# Patient Record
(demographics unavailable — no encounter records)

---

## 2024-10-15 NOTE — PHYSICAL EXAM
[Normal] : no acute distress, well nourished, well developed and well-appearing [de-identified] : No overt abnormalities of the testicles appreciated on exam [de-identified] : Left inguinal tenderness to palpation. No masses felt, no obvious hernias present [de-identified] : Reproducible pain upon palpation along paraspinal region on left side [de-identified] : Back pain not elicited with straight leg raise

## 2024-10-15 NOTE — PHYSICAL EXAM
[Normal] : no acute distress, well nourished, well developed and well-appearing [de-identified] : Left inguinal tenderness to palpation. No masses felt, no obvious hernias present [de-identified] : No overt abnormalities of the testicles appreciated on exam [de-identified] : Reproducible pain upon palpation along paraspinal region on left side [de-identified] : Back pain not elicited with straight leg raise

## 2024-10-15 NOTE — PHYSICAL EXAM
[Normal] : no acute distress, well nourished, well developed and well-appearing [de-identified] : No overt abnormalities of the testicles appreciated on exam [de-identified] : Left inguinal tenderness to palpation. No masses felt, no obvious hernias present [de-identified] : Reproducible pain upon palpation along paraspinal region on left side [de-identified] : Back pain not elicited with straight leg raise

## 2024-10-15 NOTE — REVIEW OF SYSTEMS
[Abdominal Pain] : abdominal pain [Back Pain] : back pain [Fever] : no fever [Fatigue] : no fatigue [Night Sweats] : no night sweats [Recent Change In Weight] : ~T no recent weight change [Discharge] : no discharge [Earache] : no earache [Chest Pain] : no chest pain [Shortness Of Breath] : no shortness of breath [Cough] : no cough [Constipation] : no constipation [Diarrhea] : no diarrhea [Vomiting] : no vomiting [Incontinence] : no incontinence [Hesitancy] : no hesitancy [FreeTextEntry9] : Per HPI

## 2024-10-15 NOTE — REVIEW OF SYSTEMS
[Abdominal Pain] : abdominal pain [Back Pain] : back pain [Fever] : no fever [Fatigue] : no fatigue [Night Sweats] : no night sweats [Recent Change In Weight] : ~T no recent weight change [Discharge] : no discharge [Earache] : no earache [Chest Pain] : no chest pain [Shortness Of Breath] : no shortness of breath [Cough] : no cough [Constipation] : no constipation [Diarrhea] : no diarrhea [Vomiting] : no vomiting [Hesitancy] : no hesitancy [Incontinence] : no incontinence [FreeTextEntry9] : Per HPI

## 2024-10-15 NOTE — ASSESSMENT
[FreeTextEntry1] : 61 year old man with history of T2DM, chronic back pain presenting for acute visit  #Back pain #Groin pain #Posterior left leg pain - Pain reproducible on palpation of paraspinal region on left side - No obvious masses on palpation of abdomen, no hernias appreciated - No red flag symptoms elicited on history - Suspect likely MSK etiology, though patient unable to think of inciting event. Possible muscular insertions irritating nerve roots - Lower suspicion for kidney stone, no endorsed hematuria and given overall duration of groin pain - Will trial meloxicam 15mg daily for 10 days, with addition as needed doses daily at patient discretion - Will also trial as needed 10mg flexeril taken at bedtime. Cautioned no driving if patient felt to be too drowsy - Cautioned to not take other NSAIDs while on meloxicam. Can continue with tylenol as needed - Patient counseled to contact office if still no improvement or worsening of symptoms despite treatment. ED precautions given as well regarding red flag symptom onset or pain not relieved by medication  Return to clinic as needed   Patient's visit and plan of care discussed with Dr. Simón Bush MD PGY3

## 2024-10-15 NOTE — PHYSICAL EXAM
[Normal] : no acute distress, well nourished, well developed and well-appearing [de-identified] : No overt abnormalities of the testicles appreciated on exam [de-identified] : Left inguinal tenderness to palpation. No masses felt, no obvious hernias present [de-identified] : Reproducible pain upon palpation along paraspinal region on left side [de-identified] : Back pain not elicited with straight leg raise

## 2024-10-15 NOTE — HISTORY OF PRESENT ILLNESS
[FreeTextEntry8] : 61 year old man with history of T2DM, chronic back pain presenting for acute visit Chronic back pain usually bilateral  Groin pain for about a month's duration, which he was seen for previously. Getting worse over past three days 3 days ago started with worsening pain in back on left side that will travel down back of leg to level of knee. Feels like shock. Happens when sitting/driving. Not as bad when up and walking. Bending over makes pain worse in back Last night thought he was going to have to go to the emergency room. Took Aleve which helped make pain better Has tried Tylenol and lidocaine patches, don't overly help No issues with urination or with having bowel movement No pain with urination No blood in urine No recent injuries, no heavy lifting No nausea or vomiting No fevers Nothing else except moving makes pain worse Pain is constant in all the areas

## 2024-10-25 NOTE — ASSESSMENT
[FreeTextEntry1] : Patient presents with difficulty lifting upper lips due to lips mass. No neurological deficits. MRI brain negative for stroke.   Plan - ENT referral - Can stop aspirin, but should continue atorvastatin due to elevated cholesterol - No need to followup with neurology  Discussed with Dr. Solano.

## 2024-10-25 NOTE — HISTORY OF PRESENT ILLNESS
[FreeTextEntry1] : 62 yo male with PMH of DM not on medications presents with difficulty lifting his upper lips when he smiles since 2 weeks ago. He feels there is a mass on the left side of his upper lips with some pain and numbness in the local area when he touches. Had a negative brain MRI. Denies weakness, numbness, vision changes, speech changes, difficulty swallowing.

## 2024-10-25 NOTE — END OF VISIT
[] : Resident [FreeTextEntry3] : Patient with problems smiling for the past two weeks. Palpable mass on his gums/lip during this time, no major pain. Denies any other focal neurologic deficits. Normal neurologic exam save for decreased activation of nasalis muscle due to mass. Will refer to ENT. Can stop ASA as not a cerebrovascular event but can continue statin. [Time Spent: ___ minutes] : I have spent [unfilled] minutes of time on the encounter which excludes teaching and separately reported services.

## 2024-10-25 NOTE — PHYSICAL EXAM
[General Appearance - Alert] : alert [General Appearance - In No Acute Distress] : in no acute distress [Oriented To Time, Place, And Person] : oriented to person, place, and time [Person] : oriented to person [Place] : oriented to place [Time] : oriented to time [Cranial Nerves Optic (II)] : visual acuity intact bilaterally,  visual fields full to confrontation, pupils equal round and reactive to light [Cranial Nerves Oculomotor (III)] : extraocular motion intact [Cranial Nerves Trigeminal (V)] : facial sensation intact symmetrically [Cranial Nerves Facial (VII)] : face symmetrical [Cranial Nerves Vestibulocochlear (VIII)] : hearing was intact bilaterally [Cranial Nerves Glossopharyngeal (IX)] : tongue and palate midline [Cranial Nerves Accessory (XI - Cranial And Spinal)] : head turning and shoulder shrug symmetric [Cranial Nerves Hypoglossal (XII)] : there was no tongue deviation with protrusion [Motor Tone] : muscle tone was normal in all four extremities [Motor Strength] : muscle strength was normal in all four extremities [Sensation Tactile Decrease] : light touch was intact [Abnormal Walk] : normal gait [2+] : Patella left 2+ [1+] : Ankle jerk left 1+ [Dysdiadochokinesia Bilaterally] : not present [Coordination - Dysmetria Impaired Finger-to-Nose Bilateral] : not present

## 2024-10-25 NOTE — HISTORY OF PRESENT ILLNESS
[FreeTextEntry1] : 60 yo male with PMH of DM not on medications presents with difficulty lifting his upper lips when he smiles since 2 weeks ago. He feels there is a mass on the left side of his upper lips with some pain and numbness in the local area when he touches. Had a negative brain MRI. Denies weakness, numbness, vision changes, speech changes, difficulty swallowing.

## 2024-11-05 NOTE — PHYSICAL EXAM
[No Acute Distress] : no acute distress [Well Nourished] : well nourished [Well Developed] : well developed [Well-Appearing] : well-appearing [Normal Sclera/Conjunctiva] : normal sclera/conjunctiva [PERRL] : pupils equal round and reactive to light [EOMI] : extraocular movements intact [Normal Outer Ear/Nose] : the outer ears and nose were normal in appearance [Normal Oropharynx] : the oropharynx was normal [No JVD] : no jugular venous distention [No Lymphadenopathy] : no lymphadenopathy [Supple] : supple [Thyroid Normal, No Nodules] : the thyroid was normal and there were no nodules present [No Respiratory Distress] : no respiratory distress  [No Accessory Muscle Use] : no accessory muscle use [Clear to Auscultation] : lungs were clear to auscultation bilaterally [Normal Rate] : normal rate  [Regular Rhythm] : with a regular rhythm [Normal S1, S2] : normal S1 and S2 [No Murmur] : no murmur heard [No Carotid Bruits] : no carotid bruits [No Abdominal Bruit] : a ~M bruit was not heard ~T in the abdomen [No Varicosities] : no varicosities [Pedal Pulses Present] : the pedal pulses are present [No Edema] : there was no peripheral edema [No Palpable Aorta] : no palpable aorta [No Extremity Clubbing/Cyanosis] : no extremity clubbing/cyanosis [Soft] : abdomen soft [Non Tender] : non-tender [Non-distended] : non-distended [No Masses] : no abdominal mass palpated [No HSM] : no HSM [Normal Bowel Sounds] : normal bowel sounds [Normal Posterior Cervical Nodes] : no posterior cervical lymphadenopathy [Normal Anterior Cervical Nodes] : no anterior cervical lymphadenopathy [No CVA Tenderness] : no CVA  tenderness [No Spinal Tenderness] : no spinal tenderness [No Joint Swelling] : no joint swelling [Grossly Normal Strength/Tone] : grossly normal strength/tone [No Rash] : no rash [Coordination Grossly Intact] : coordination grossly intact [No Focal Deficits] : no focal deficits [Normal Gait] : normal gait [Deep Tendon Reflexes (DTR)] : deep tendon reflexes were 2+ and symmetric [Normal Affect] : the affect was normal [Normal Insight/Judgement] : insight and judgment were intact [de-identified] : Pain with palpation of the posterolateral left testicle, no pain with palpation of epididymis, increased pain when lifting testicle

## 2024-11-05 NOTE — ASSESSMENT
[FreeTextEntry1] : 61 year old man with history of T2DM, chronic back pain presenting for acute visit for recurrent testicular pain    #Groin pain - previous course of Flexeril and meloxicam can help relieve symptoms - low suspicion for testicular torsion. Patient is not in acute distress at the time of the visit  - No erythema of swelling seen on exam   PLAN  - CT AP with IV contrast to evaluate for abdominal pathology including hernia not seen on exam  - Testicular US  - renewed Flexeril and Meloxicam  - advised on alarm symptoms of testicular Torsion and need to go to the ED - low threshold to call back and return for worsening of symptoms

## 2024-11-05 NOTE — HISTORY OF PRESENT ILLNESS
[FreeTextEntry8] : 61 year old man with history of T2DM, chronic back pain presenting for acute visit  #Scrotal pain  Presenting for pain in his right testicle. Was seen earlier in October at that time also radiated to his back. Was given Meloxicam and Flexeril which did help but pain returned only in testicles yesterday morning. Pain went away for a week after completing course of muscle relaxant  and since it returned the has taken Alleve and the last of his meloxicam helped minimally. Endorses an 8/10 pain this morning, Pain also radiates to his right hip along the path of the inguinal ligament.  No issues with urination or with having bowel movement No pain with urination No blood in urine No recent injuries, no heavy lifting No nausea or vomiting No fevers Sexually active with one partner  No discharge from the penis

## 2024-12-16 NOTE — PHYSICAL EXAM
[Normal Appearance] : normal appearance [] : no respiratory distress [Exaggerated Use Of Accessory Muscles For Inspiration] : no accessory muscle use [Urethral Meatus] : meatus normal [Penis Abnormality] : normal uncircumcised penis [de-identified] : tenderness to palpation of the left spermatic cord.

## 2024-12-16 NOTE — ASSESSMENT
[FreeTextEntry1] : 61-year-old male w/ hx prediabetes presents as a new patient w/ CC left testicular pain and ED  #testicular pain sxs c/w symptomatic varicocele. Pt has been taking NSAIDs and muscle relaxants, however expressed concern about taking pain medications for the rest of his life. Offered surgery w/ embolization/ligation of varicose veins, which pt interested in but would like some time to think about it.  -F/u in three weeks   #ED Pt w/ difficulty generating and maintaining erections, which he has managed w/ viagra. Interesting in continuing Viagra. -100mg Viagra PRN  #New pt; prostate cancer screen  PSA (2019): 1  - PSA

## 2024-12-16 NOTE — HISTORY OF PRESENT ILLNESS
[None] : no symptoms [FreeTextEntry1] : 61m w/ hx pre-diabetes, presents w/ left testicular pain since two months ago. Pain started suddenly around two months ago, alleviated by tylenol. Pt was initially evaluated by PCP who prescribed meloxicam and Flexeril with complete resolution. However, patient reports pain typically comes back two after stopping the NSAID and muscle relaxant. Workup U/S was notable for microlithiasis and left sided varicocele w/ b/l mild hydrocele.   Erectile dysfunction Patient reports hx difficulty generating and maintaining an erection (5 minutes). Reports morning erections. Denies any anxiety. Pt takes viagra purchased online for ED with great results.   No sxs of obstructive LUTS, No nocturia, no hematuria. No PSA  Pt non-smoker, non-drinker no fhx cancer no surgical hx NKDA

## 2024-12-31 NOTE — HISTORY OF PRESENT ILLNESS
[de-identified] : 61 year old male referred by neurologist for bump above lip. Reports the last 2 months he has felt a bump above his lip on the Left side. Pain when he touches it. Reports that when he eats he almost bites his lip because it does not move.  MRI Brain Completed States feels his throat is always dry in the morning. Is not able to breath well through nose Denies dysphagia, odynophagia.

## 2024-12-31 NOTE — PHYSICAL EXAM
[Nasal Endoscopy Performed] : nasal endoscopy was performed, see procedure section for findings [Normal] : mucosa is normal [Midline] : trachea located in midline position [FreeTextEntry1] : Left upper lip philtrum tenderness

## 2024-12-31 NOTE — PHYSICAL EXAM
[Nasal Endoscopy Performed] : nasal endoscopy was performed, see procedure section for findings [Normal] : mucosa is normal [Midline] : trachea located in midline position [FreeTextEntry1] : Left upper lip philtrum tenderness [Negative] : Heme/Lymph

## 2024-12-31 NOTE — ASSESSMENT
[FreeTextEntry1] : 61 year M present with Lip infection, Throat Dryness 2/2 NSD and Mouth breathing    10/4/24 As per radiology MRI Brain w/wo contrast shows No parenchymal mass, acute intracranial hemorrhage or acute infarct. Moderate chronic microvascular ischemic changes. Incidental 2 mm cystic lesion in the pituitary gland. Correlate with pituitary enzymes.  Nasal endoscopy shows S-Shaped DNS, Moderate Inferior Turbinates Hypertrophy, No polyps, purulence or masses, Posterior Nasal pharynx Cobblestone/Clear Drainage, Moderate mucus production coating nasal cavity   Recommend: Left upper lip philtrum tenderness -Possible folliculitis Trial of Augmentin 875mg PO BID 10 day  Dry Throat / Nasal septal deviation -Discussed the importance of rinsing the sinus before using nasal spray so that excess mucus lining the nasal cavity can be wash away so medication can penetration the nose -Discussed deviated septum occurs when the thin wall between your nasal passages is displaced to one side.When the nasal septum is off-center it makes one nasal passage smaller. -If a deviated septum is severe, it can block one side of the nose and reduce airflow, causing difficulty breathing. -The exposure of a deviated septum to the drying effect of airflow through the nose may sometimes contribute to crusting or bleeding in certain people. -Treatment of nasal obstruction includes nasal steroids to reduce the swelling. -Send to pharmacy Flonase nasal spray to be used after completing daily Sinus Rinse -If conservative medical management is not effective correction of the deviated septum through surgery may be needed.  -Return to clinic in 1 months or sooner if new/worsen symptoms present

## 2024-12-31 NOTE — HISTORY OF PRESENT ILLNESS
[de-identified] : 61 year old male referred by neurologist for bump above lip. Reports the last 2 months he has felt a bump above his lip on the Left side. Pain when he touches it. Reports that when he eats he almost bites his lip because it does not move.  MRI Brain Completed States feels his throat is always dry in the morning. Is not able to breath well through nose Denies dysphagia, odynophagia.   Topical Retinoid counseling:  Patient advised to apply a pea-sized amount only at bedtime and wait 30 minutes after washing their face before applying.  If too drying, patient may add a non-comedogenic moisturizer. The patient verbalized understanding of the proper use and possible adverse effects of retinoids.  All of the patient's questions and concerns were addressed.

## 2024-12-31 NOTE — HISTORY OF PRESENT ILLNESS
[de-identified] : 61 year old male referred by neurologist for bump above lip. Reports the last 2 months he has felt a bump above his lip on the Left side. Pain when he touches it. Reports that when he eats he almost bites his lip because it does not move.  MRI Brain Completed States feels his throat is always dry in the morning. Is not able to breath well through nose Denies dysphagia, odynophagia.

## 2025-01-06 NOTE — HISTORY OF PRESENT ILLNESS
[FreeTextEntry1] : 61m w/ hx pre-diabetes, presents w/ left testicular pain since two months ago. Pain started suddenly around two months ago, alleviated by tylenol. Pt was initially evaluated by PCP who prescribed meloxicam and Flexeril with complete resolution. However, patient reports pain typically comes back two after stopping the NSAID and muscle relaxant. Workup U/S was notable for microlithiasis and left sided varicocele w/ b/l mild hydrocele.  Erectile dysfunction Patient reports hx difficulty generating and maintaining an erection (5 minutes). Reports morning erections. Denies any anxiety. Pt takes viagra purchased online for ED with great results.  No sxs of obstructive LUTS, No nocturia, no hematuria. No PSA  Pt non-smoker, non-drinker no fhx cancer no surgical hx NKDA  Patient is currently experiencing no symptoms.  1/6/24: Patients PSA was 1.67 from last visit so wnl. Patient continues to endorse L testicular pain but states that it is intermittent with no clear linkage to movement/position and at it's worse is 2/10 and lasts for 5 seconds then goes away. He continues to take the Viagra 100mg and states that it is helping to improve his erections and has no complaints at this time.

## 2025-01-06 NOTE — ASSESSMENT
[FreeTextEntry1] : 61-year-old male w/ hx prediabetes presents as a new patient w/ CC left testicular pain and ED  #testicular pain US shows small L varicocele which was not palpable on examination. Scrotal US reviewed and also shows small b/l hydroceles, and small punctate echogenic microlithiasis. Given that patients pain is very mild (1-2/10) and only lasts for 5 seconds at a time and does not seem to be classic for varicocele pain no plan for surgical intervention. Plan to f/u in 6 months to reassess pain and if still having pain then will plan for repeat US to ensure no additional pathology explaining pain  #ED Pt w/ difficulty generating and maintaining erections, which he has managed w/ viagra. Interesting in continuing Viagra. -100mg Viagra PRN  #New pt; prostate cancer screen PSA (2019): 1 - PSA 1.6 at last visit will need in 1 year

## 2025-01-06 NOTE — PHYSICAL EXAM
[General Appearance - Well Developed] : well developed [General Appearance - Well Nourished] : well nourished [Normal Appearance] : normal appearance [] : no respiratory distress [Abdomen Soft] : soft [Abdomen Tenderness] : non-tender [Skin Color & Pigmentation] : normal skin color and pigmentation [Oriented To Time, Place, And Person] : oriented to person, place, and time [Affect] : the affect was normal [de-identified] : No palpable varicocele in the L cord, some tenderness along the L cord

## 2025-04-07 NOTE — HEALTH RISK ASSESSMENT
[Good] : ~his/her~  mood as  good [1 or 2 (0 pts)] : 1 or 2 (0 points) [Never (0 pts)] : Never (0 points) [No] : In the past 12 months have you used drugs other than those required for medical reasons? No [No falls in past year] : Patient reported no falls in the past year [Little interest or pleasure doing things] : 1) Little interest or pleasure doing things [Feeling down, depressed, or hopeless] : 2) Feeling down, depressed, or hopeless [0] : 2) Feeling down, depressed, or hopeless: Not at all (0) [PHQ-2 Negative - No further assessment needed] : PHQ-2 Negative - No further assessment needed [Former] : Former [> 15 Years] : > 15 Years [NO] : No [With Family] : lives with family [Employed] : employed [] :  [Sexually Active] : sexually active [Fully functional (bathing, dressing, toileting, transferring, walking, feeding)] : Fully functional (bathing, dressing, toileting, transferring, walking, feeding) [Fully functional (using the telephone, shopping, preparing meals, housekeeping, doing laundry, using] : Fully functional and needs no help or supervision to perform IADLs (using the telephone, shopping, preparing meals, housekeeping, doing laundry, using transportation, managing medications and managing finances) [Audit-CScore] : 0 [de-identified] : no exercise [de-identified] : Eats everything. Trying to avoid sugar and salt.  [KRR0Tgknb] : 0 [de-identified] : 2 pack a day x 8. [High Risk Behavior] : no high risk behavior [ColonoscopyDate] : 07/18 [ColonoscopyComments] : repeat in 3 yrs. [de-identified] : son and wife  [FreeTextEntry2] :  (assisted living)

## 2025-04-07 NOTE — PLAN
[FreeTextEntry1] : 62 year old with PMH T2DM, chronic back pain, carcinoid tumor s/p resection.  DM2 -  Diet controlled. No exercise.  -  A1C 6.2 (10/2024). will repeat A1C today.   HLD - . Not on statin.  - Will repeat lipid today.  - Continue to cut down high carb and high fat food. Need to increase physical activity.   Mild Transaminitis - AST  46 and ALT 84.  - 11/2024 CTAP showed mild hepatic steatosis   GERD - Avoid fried food, fast food, spicy food, coffee, chocolate, fatty meats (kearney, sausage), sour food (tomato, orange, lemon, pineapple), carbonated drinks. - Try PPI on empty stomach. Avoid eating 3 hours before bedtime.   Chronic lower back pain - Works as  and stands for long hours - Conservative management. Continue to take OTC pain med and pain creams.  HCM  - CBC, CMP, A1C, alb/cr ratio, lipid, PSA, QFT today -Colonoscopy: 2018, repeat in 3 yrs.  Due for colonoscopy. GI referral given today.  - Vaccines: Tdap (2020 UTD), Prevnar (2023). declined influenza, declined 2nd Shingrix.

## 2025-04-07 NOTE — PHYSICAL EXAM
[de-identified] :  Constitutional:  no acute distress, well-appearing and normal voice/communication.   Eyes:  normal sclera/conjunctiva, pupils equal round and reactive to light and extraocular movements intact.   ENT:  the outer ears and nose were normal in appearance, the oropharynx was normal and both tympanic membranes were normal.   Neck:  no lymphadenopathy, supple and the thyroid was normal and there were no nodules present.   Pulmonary:  no respiratory distress, no accessory muscle use, lungs were clear to auscultation bilaterally.   Cardiac:  normal rate, with a regular rhythm, normal S1 and S2 and no murmur heard.   Vascular:  the pedal pulses are present and there was no peripheral edema.   Abdomen:  abdomen soft, non-tender and normal bowel sounds.   Lymphatic:  no posterior cervical lymphadenopathy, no anterior cervical lymphadenopathy.   Back:  no CVA tenderness and no spinal tenderness.   Musculoskeletal:  no joint swelling and grossly normal strength/tone.   Skin:  no rash and no skin lesions.   Neurology:  coordination grossly intact, no focal deficits and normal gait.   Psychiatric:  the affect was normal, the mood was normal and insight and judgment were intact.

## 2025-04-07 NOTE — REVIEW OF SYSTEMS
[Heartburn] : heartburn [Back Pain] : back pain [Fever] : no fever [Chills] : no chills [Fatigue] : no fatigue [Discharge] : no discharge [Pain] : no pain [Redness] : no redness [Earache] : no earache [Nasal Discharge] : no nasal discharge [Sore Throat] : no sore throat [Chest Pain] : no chest pain [Palpitations] : no palpitations [Lower Ext Edema] : no lower extremity edema [Shortness Of Breath] : no shortness of breath [Wheezing] : no wheezing [Cough] : no cough [Abdominal Pain] : no abdominal pain [Nausea] : no nausea [Vomiting] : no vomiting [Dysuria] : no dysuria [Hematuria] : no hematuria [Frequency] : no frequency [Itching] : no itching [Skin Rash] : no skin rash [Headache] : no headache [Dizziness] : no dizziness [Fainting] : no fainting [Suicidal] : not suicidal [Insomnia] : no insomnia [Anxiety] : no anxiety [Depression] : no depression [Easy Bleeding] : no easy bleeding [Easy Bruising] : no easy bruising [Swollen Glands] : no swollen glands [FreeTextEntry7] : see HPI [FreeTextEntry9] : chronic back pain.

## 2025-06-02 NOTE — HISTORY OF PRESENT ILLNESS
[FreeTextEntry8] : 62 year old with PMH T2DM, chronic back pain, carcinoid tumor s/p resection came for multiple concerns  Heartburn: he has heartburn with morning dry cough for 3wks. He takes GEMA for heartburn with some relief. Never had endoscopy and also due for colonoscopy. Last visit, Omeprazole was prescribed, but he reports pharmacy never dispense it. Denied weight loss, n/v, bloody stool.   Lower back pain: chronic issue and recently felt more back stiffness, pain 6/10, worsened with prolongs standing. He used Bengay and topical patches for pain. Reports standing approx 30hours per week for work. Declined Physical therapy. Denied bowel/bladder incontinence, muscle weakness.   Skin lesion: he noted a pimple on his neck for 3-4 days. He attempted to pop it and had scant bleeding. "pimple" now is red and appears to be inflamed. Denied fever, chill, active drainage.   DM: A1C 7.7 (04/2025), recently restart on metformin. He also eats healthier.

## 2025-06-02 NOTE — REVIEW OF SYSTEMS
[Heartburn] : heartburn [Fever] : no fever [Chills] : no chills [Fatigue] : no fatigue [Chest Pain] : no chest pain [Palpitations] : no palpitations [Lower Ext Edema] : no lower extremity edema [Shortness Of Breath] : no shortness of breath [Wheezing] : no wheezing [Cough] : no cough [Nausea] : no nausea [Vomiting] : no vomiting [FreeTextEntry4] : dry cough, see HPI [FreeTextEntry9] : see HPI

## 2025-06-02 NOTE — PHYSICAL EXAM
[No Acute Distress] : no acute distress [Well-Appearing] : well-appearing [Normal Voice/Communication] : normal voice/communication [Normal Outer Ear/Nose] : the outer ears and nose were normal in appearance [Normal Oropharynx] : the oropharynx was normal [Normal TMs] : both tympanic membranes were normal [No Lymphadenopathy] : no lymphadenopathy [Supple] : supple [No Respiratory Distress] : no respiratory distress  [No Accessory Muscle Use] : no accessory muscle use [Clear to Auscultation] : lungs were clear to auscultation bilaterally [Normal Rate] : normal rate  [Regular Rhythm] : with a regular rhythm [Normal S1, S2] : normal S1 and S2 [Soft] : abdomen soft [Non Tender] : non-tender [Normal Bowel Sounds] : normal bowel sounds [Grossly Normal Strength/Tone] : grossly normal strength/tone [de-identified] : Pain with back flexion and extension.  [de-identified] : 1-2cm round red firm and tenderness papule on neck. No drainage.
